# Patient Record
Sex: MALE | Race: BLACK OR AFRICAN AMERICAN | NOT HISPANIC OR LATINO | Employment: OTHER | URBAN - METROPOLITAN AREA
[De-identification: names, ages, dates, MRNs, and addresses within clinical notes are randomized per-mention and may not be internally consistent; named-entity substitution may affect disease eponyms.]

---

## 2024-10-24 ENCOUNTER — HOSPITAL ENCOUNTER (OUTPATIENT)
Dept: RADIOLOGY | Facility: HOSPITAL | Age: 79
Discharge: HOME | End: 2024-10-24
Attending: FAMILY MEDICINE
Payer: OTHER GOVERNMENT

## 2024-10-24 ENCOUNTER — HOSPITAL ENCOUNTER (OUTPATIENT)
Dept: RADIOLOGY | Facility: HOSPITAL | Age: 79
Discharge: HOME | End: 2024-10-24
Attending: SPECIALIST
Payer: OTHER GOVERNMENT

## 2024-10-24 DIAGNOSIS — M54.10 RADICULOPATHY: ICD-10-CM

## 2024-10-24 DIAGNOSIS — N28.9 RENAL DISEASE: ICD-10-CM

## 2024-10-24 PROCEDURE — 76770 US EXAM ABDO BACK WALL COMP: CPT | Mod: 26,,, | Performed by: RADIOLOGY

## 2024-10-24 PROCEDURE — 76770 US EXAM ABDO BACK WALL COMP: CPT | Mod: TC,PN

## 2024-10-24 PROCEDURE — 72148 MRI LUMBAR SPINE W/O DYE: CPT | Mod: 26,,, | Performed by: RADIOLOGY

## 2024-10-24 PROCEDURE — 72148 MRI LUMBAR SPINE W/O DYE: CPT | Mod: TC,PN

## 2025-01-11 ENCOUNTER — HOSPITAL ENCOUNTER (EMERGENCY)
Facility: HOSPITAL | Age: 80
Discharge: HOME | End: 2025-01-11
Attending: EMERGENCY MEDICINE
Payer: OTHER GOVERNMENT

## 2025-01-11 VITALS
DIASTOLIC BLOOD PRESSURE: 78 MMHG | HEIGHT: 73 IN | RESPIRATION RATE: 18 BRPM | BODY MASS INDEX: 27.09 KG/M2 | TEMPERATURE: 98 F | OXYGEN SATURATION: 98 % | HEART RATE: 87 BPM | WEIGHT: 204.38 LBS | SYSTOLIC BLOOD PRESSURE: 142 MMHG

## 2025-01-11 DIAGNOSIS — J18.9 PNEUMONIA OF RIGHT MIDDLE LOBE DUE TO INFECTIOUS ORGANISM: Primary | ICD-10-CM

## 2025-01-11 DIAGNOSIS — R05.9 COUGH: ICD-10-CM

## 2025-01-11 PROCEDURE — 99284 EMERGENCY DEPT VISIT MOD MDM: CPT | Mod: 25

## 2025-01-11 RX ORDER — AMOXICILLIN AND CLAVULANATE POTASSIUM 875; 125 MG/1; MG/1
1 TABLET, FILM COATED ORAL 2 TIMES DAILY
Qty: 10 TABLET | Refills: 0 | Status: SHIPPED | OUTPATIENT
Start: 2025-01-11 | End: 2025-01-16

## 2025-01-11 RX ORDER — AMOXICILLIN AND CLAVULANATE POTASSIUM 875; 125 MG/1; MG/1
1 TABLET, FILM COATED ORAL 2 TIMES DAILY
Qty: 10 TABLET | Refills: 0 | Status: SHIPPED | OUTPATIENT
Start: 2025-01-11 | End: 2025-01-11

## 2025-01-11 RX ORDER — GUAIFENESIN 600 MG/1
600 TABLET, EXTENDED RELEASE ORAL 2 TIMES DAILY
Qty: 10 TABLET | Refills: 0 | Status: SHIPPED | OUTPATIENT
Start: 2025-01-11 | End: 2025-01-11

## 2025-01-11 RX ORDER — GUAIFENESIN 600 MG/1
600 TABLET, EXTENDED RELEASE ORAL 2 TIMES DAILY
Qty: 10 TABLET | Refills: 0 | Status: SHIPPED | OUTPATIENT
Start: 2025-01-11 | End: 2025-01-16

## 2025-01-11 NOTE — DISCHARGE INSTRUCTIONS
Follow up with your primary care provider for repeat chest x-ray in 4-6 weeks.  If symptoms progress or worsened come back to the emergency department.  I am adding an antibiotic called Augmentin.  Continue to take the azithromycin until it is gone.  You can continue all other medications as prescribed.

## 2025-01-11 NOTE — ED PROVIDER NOTES
Encounter Date: 1/11/2025       History     Chief Complaint   Patient presents with    Cough     Pt c/o cough and chest congestion x 5 days.  Pt was seen at urgent care and was prescribed prednisone, azithromycin, loratadine, and cough medicine with no improvement.     79-year-old male presents to the emergency department for a chief complaint of cough and nasal congestion.  Reports that this began about 5 days ago.  Reports he was seen at urgent care and written prescriptions for Benzonate, prednisone, azithromycin, and loratadine.  Reports he tested negative for COVID and flu during visit. Reports he has been taking these medications for the past 4 days.  Reports that he is continuing to cough.  Reports that he feels that he has chest congestion still.  The patient is concerned he may have pneumonia.  He denies any fever, chills, shortness for breath, chest pain, dizziness, lightheadedness, headaches, abdominal pain, nausea, vomiting or any other symptoms.    The history is provided by the patient. No  was used.     Review of patient's allergies indicates:  No Known Allergies  Past Medical History:   Diagnosis Date    Arthritis     Hypertension      No past surgical history on file.  No family history on file.  Social History     Tobacco Use    Smoking status: Never   Substance Use Topics    Alcohol use: Yes     Review of Systems   Constitutional:  Negative for chills and fever.   HENT:  Positive for congestion. Negative for sore throat.    Respiratory:  Positive for cough. Negative for shortness of breath.    Cardiovascular:  Negative for chest pain.   Gastrointestinal:  Negative for abdominal pain, nausea and vomiting.   Genitourinary:  Negative for dysuria.   Musculoskeletal:  Negative for back pain.   Skin:  Negative for rash.   Neurological:  Negative for dizziness, weakness and headaches.   Hematological:  Does not bruise/bleed easily.   All other systems reviewed and are  "negative.      Physical Exam     Initial Vitals [01/11/25 1414]   BP Pulse Resp Temp SpO2   (!) 145/82 93 18 98.2 °F (36.8 °C) 98 %      MAP       --       ED Course Vitals  Vitals:    01/11/25 1414 01/11/25 1605   BP: (!) 145/82 (!) 142/78   BP Location: Right arm Right arm   Patient Position:  Sitting   Pulse: 93 87   Resp: 18 18   Temp: 98.2 °F (36.8 °C) 98.3 °F (36.8 °C)   TempSrc: Oral Oral   SpO2: 98% 98%   Weight: 92.7 kg (204 lb 6.4 oz)    Height: 6' 1" (1.854 m)        Physical Exam    Nursing note and vitals reviewed.  Constitutional: He appears well-developed and well-nourished.  Non-toxic appearance. He does not have a sickly appearance. He does not appear ill.   HENT:   Head: Normocephalic and atraumatic.   Right Ear: Hearing normal.   Left Ear: Hearing normal.   Nose: Nose normal. Mouth/Throat: Uvula is midline, oropharynx is clear and moist and mucous membranes are normal.   Eyes: Conjunctivae, EOM and lids are normal. Pupils are equal, round, and reactive to light.   Neck: Trachea normal. Neck supple.   Normal range of motion.  Cardiovascular:  Normal rate, regular rhythm, normal heart sounds, intact distal pulses and normal pulses.           Pulmonary/Chest: Effort normal. No accessory muscle usage. No tachypnea. No respiratory distress. He has rhonchi.   Abdominal: Abdomen is soft. Bowel sounds are normal. There is no abdominal tenderness. There is no rebound and no guarding.   Musculoskeletal:         General: Normal range of motion.      Cervical back: Normal, normal range of motion and neck supple.     Neurological: He is alert and oriented to person, place, and time. He has normal strength and normal reflexes. No cranial nerve deficit or sensory deficit. GCS eye subscore is 4. GCS verbal subscore is 5. GCS motor subscore is 6.   Skin: Skin is warm, dry and intact. No rash noted.   Psychiatric: He has a normal mood and affect. His behavior is normal. Thought content normal.         ED Course "   Procedures  Labs Reviewed - No data to display         Imaging Results              X-Ray Chest AP Portable (Final result)  Result time 01/11/25 15:01:52      Final result by Aram Boucher MD (01/11/25 15:01:52)                   Impression:      1.  Subtle right mid lung infiltrate.  Lungs are otherwise clear.  Treatment for presumed early pneumonia and follow-up x-rays in 4-6 weeks recommended to ensure resolution after adequate treatment.    2.  Incidental findings as noted above.      Electronically signed by: Aram Boucher MD  Date:    01/11/2025  Time:    15:01               Narrative:    EXAMINATION:  XR CHEST AP PORTABLE    CLINICAL HISTORY:  Cough, unspecified    COMPARISON:  No comparison studies are available.    FINDINGS:  Subtle lateral right mid lung infiltrate.  The lungs are otherwise clear.  The cardiac silhouette size is normal. The trachea is midline and the mediastinal width is normal. Negative for effusion or pneumothorax. Pulmonary vasculature is normal. Negative for osseous abnormalities. Ectatic and tortuous aorta.  Convex left curvature of the upper thoracic spine with marginal spondylosis.  Postoperative changes involve the lumbar spine.  Degenerative changes of the shoulder girdles.  Mild elevation of the right hemidiaphragm.                                       Medications - No data to display  Medical Decision Making  79-year-old male presents to the emergency department 5 days post viral URI type illness. Reports that it was seen at urgent care 1 day after symptoms began and placed on prednisone, azithromycin, loratadine, and Tessalon Perles.  States that his symptoms were about the same, but that he was not getting better. Reports cough has been ongoing without production of sputum.  He has no other symptoms present in the emergency department today such as chest pain, shortness for breath, fever, chills, or any other complaints. He is awake alert oriented, in no acute distress,  he is not tachypneic, he is not febrile, he is nontoxic appearing, answering questions appropriately, neurologically and neurovascularly intact, has rhonchi noted to the right lung fields on physical exam.  Chest x-ray shows subtle right mid lung infiltrate.  Discussed with the patient that he was already taking azithromycin which covers for pneumonia, we will add Augmentin to regimen.  Discussed with the patient to continue taking medications that he is already prescribed, add Augmentin for the next 5 days.  Discussed with the patient that he needs to follow up with his primary care provider in the next 4-6 weeks for repeat chest x-ray if symptoms are getting better.  Discussed with the patient that he will return to the emergency department for any new or worsening symptoms such as fever, shortness for breath, tachypnea, worsening condition, or any other symptoms.  He verbalized understanding.  Considering the patient's appearance today here in the emergency department, I do believe that patient is a candidate for outpatient antibiotic therapy for pneumonia.  Discussed with the patient that if any signs and symptoms were to worsened that he should return to the emergency department for failed outpatient therapy and possible hospital admission.  He verbalized understanding.    I discussed with patient and/or family/caretaker that evaluation in the ED does not suggest any emergent or life threatening medical conditions requiring immediate intervention beyond what was provided in the ED, and I believe patient is safe for discharge. Regardless, an unremarkable evaluation in the ED does not preclude the development or presence of a serious of life threatening condition. As such, patient was instructed to return immediately for any worsening or change in current symptoms.     Amount and/or Complexity of Data Reviewed  Radiology: ordered.    Risk  OTC drugs.  Prescription drug management.                                       Clinical Impression:  Final diagnoses:  [R05.9] Cough  [J18.9] Pneumonia of right middle lobe due to infectious organism (Primary)          ED Disposition Condition    Discharge Stable          ED Prescriptions       Medication Sig Dispense Start Date End Date Auth. Provider    amoxicillin-clavulanate 875-125mg (AUGMENTIN) 875-125 mg per tablet  (Status: Discontinued) Take 1 tablet by mouth 2 (two) times daily. for 5 days 10 tablet 1/11/2025 1/11/2025 Marcel Ashby NP    guaiFENesin (MUCINEX) 600 mg 12 hr tablet  (Status: Discontinued) Take 1 tablet (600 mg total) by mouth 2 (two) times daily. for 5 days 10 tablet 1/11/2025 1/11/2025 Marcel Ashby NP    amoxicillin-clavulanate 875-125mg (AUGMENTIN) 875-125 mg per tablet Take 1 tablet by mouth 2 (two) times daily. for 5 days 10 tablet 1/11/2025 1/16/2025 Marcel Ashby NP    guaiFENesin (MUCINEX) 600 mg 12 hr tablet Take 1 tablet (600 mg total) by mouth 2 (two) times daily. for 5 days 10 tablet 1/11/2025 1/16/2025 Marcel Ashby NP          Follow-up Information       Follow up With Specialties Details Why Contact Info    [facility] - Emergency Dept. Emergency Medicine Go to  As needed, If symptoms worsen [id number] [facility] [id number] [phone]    Jose Rutherford MD Internal Medicine Schedule an appointment as soon as possible for a visit  Follow up chest x-ray 4-6 weeks [id number] [facility] [address] [phone]               Marcel Ashby NP  01/12/25 0788